# Patient Record
Sex: MALE
[De-identification: names, ages, dates, MRNs, and addresses within clinical notes are randomized per-mention and may not be internally consistent; named-entity substitution may affect disease eponyms.]

---

## 2020-10-21 ENCOUNTER — HOSPITAL ENCOUNTER (OUTPATIENT)
Dept: HOSPITAL 92 - LABBT | Age: 60
Discharge: HOME | End: 2020-10-21
Attending: UROLOGY
Payer: COMMERCIAL

## 2020-10-21 DIAGNOSIS — Z20.828: ICD-10-CM

## 2020-10-21 DIAGNOSIS — Z01.818: Primary | ICD-10-CM

## 2020-10-21 LAB
ANION GAP SERPL CALC-SCNC: 13 MMOL/L (ref 10–20)
APTT PPP: 29.3 SEC (ref 22.9–36.1)
BUN SERPL-MCNC: 13 MG/DL (ref 8.4–25.7)
CALCIUM SERPL-MCNC: 9.2 MG/DL (ref 7.8–10.44)
CHLORIDE SERPL-SCNC: 107 MMOL/L (ref 98–107)
CO2 SERPL-SCNC: 25 MMOL/L (ref 22–29)
CREAT CL PREDICTED SERPL C-G-VRATE: 0 ML/MIN (ref 70–130)
GLUCOSE SERPL-MCNC: 95 MG/DL (ref 70–105)
HGB BLD-MCNC: 15.2 G/DL (ref 14–18)
INR PPP: 0.8
MCH RBC QN AUTO: 34.2 PG (ref 27–31)
MCV RBC AUTO: 100 FL (ref 78–98)
PLATELET # BLD AUTO: 221 THOU/UL (ref 130–400)
POTASSIUM SERPL-SCNC: 4 MMOL/L (ref 3.5–5.1)
PROTHROMBIN TIME: 11.7 SEC (ref 12–14.7)
RBC # BLD AUTO: 4.45 MILL/UL (ref 4.7–6.1)
SODIUM SERPL-SCNC: 141 MMOL/L (ref 136–145)
WBC # BLD AUTO: 6.2 THOU/UL (ref 4.8–10.8)

## 2020-10-21 PROCEDURE — 80048 BASIC METABOLIC PNL TOTAL CA: CPT

## 2020-10-21 PROCEDURE — 85730 THROMBOPLASTIN TIME PARTIAL: CPT

## 2020-10-21 PROCEDURE — U0003 INFECTIOUS AGENT DETECTION BY NUCLEIC ACID (DNA OR RNA); SEVERE ACUTE RESPIRATORY SYNDROME CORONAVIRUS 2 (SARS-COV-2) (CORONAVIRUS DISEASE [COVID-19]), AMPLIFIED PROBE TECHNIQUE, MAKING USE OF HIGH THROUGHPUT TECHNOLOGIES AS DESCRIBED BY CMS-2020-01-R: HCPCS

## 2020-10-21 PROCEDURE — 93005 ELECTROCARDIOGRAM TRACING: CPT

## 2020-10-21 PROCEDURE — 87635 SARS-COV-2 COVID-19 AMP PRB: CPT

## 2020-10-21 PROCEDURE — 93010 ELECTROCARDIOGRAM REPORT: CPT

## 2020-10-21 PROCEDURE — 85027 COMPLETE CBC AUTOMATED: CPT

## 2020-10-21 PROCEDURE — 85610 PROTHROMBIN TIME: CPT

## 2020-10-23 ENCOUNTER — HOSPITAL ENCOUNTER (OUTPATIENT)
Dept: HOSPITAL 92 - SDC | Age: 60
Discharge: HOME | End: 2020-10-23
Attending: UROLOGY
Payer: COMMERCIAL

## 2020-10-23 VITALS — BODY MASS INDEX: 31.3 KG/M2

## 2020-10-23 DIAGNOSIS — R39.11: ICD-10-CM

## 2020-10-23 DIAGNOSIS — N40.1: ICD-10-CM

## 2020-10-23 DIAGNOSIS — E78.5: ICD-10-CM

## 2020-10-23 DIAGNOSIS — N20.0: Primary | ICD-10-CM

## 2020-10-23 DIAGNOSIS — N21.1: ICD-10-CM

## 2020-10-23 DIAGNOSIS — N99.112: ICD-10-CM

## 2020-10-23 DIAGNOSIS — K21.9: ICD-10-CM

## 2020-10-23 DIAGNOSIS — M17.9: ICD-10-CM

## 2020-10-23 PROCEDURE — 0TC38ZZ EXTIRPATION OF MATTER FROM RIGHT KIDNEY PELVIS, VIA NATURAL OR ARTIFICIAL OPENING ENDOSCOPIC: ICD-10-PCS | Performed by: UROLOGY

## 2020-10-23 PROCEDURE — 0TCB8ZZ EXTIRPATION OF MATTER FROM BLADDER, VIA NATURAL OR ARTIFICIAL OPENING ENDOSCOPIC: ICD-10-PCS | Performed by: UROLOGY

## 2020-10-23 PROCEDURE — 74420 UROGRAPHY RTRGR +-KUB: CPT

## 2020-10-23 PROCEDURE — 0T788DZ DILATION OF BILATERAL URETERS WITH INTRALUMINAL DEVICE, VIA NATURAL OR ARTIFICIAL OPENING ENDOSCOPIC: ICD-10-PCS | Performed by: UROLOGY

## 2020-10-23 PROCEDURE — 88300 SURGICAL PATH GROSS: CPT

## 2020-10-23 PROCEDURE — 0TC48ZZ EXTIRPATION OF MATTER FROM LEFT KIDNEY PELVIS, VIA NATURAL OR ARTIFICIAL OPENING ENDOSCOPIC: ICD-10-PCS | Performed by: UROLOGY

## 2020-10-23 PROCEDURE — 82365 CALCULUS SPECTROSCOPY: CPT

## 2020-10-23 NOTE — RAD
EXAM: Retrograde IVP



HISTORY: Kidney stones



COMPARISON: None



FINDINGS/IMPRESSION: Limited intraoperative fluoroscopic views of the retrograde IVP were submitted f
or interpretation. Bilateral ureteral stents are seen in good position. A small calcification

projects over the lower pole the left kidney.  No calcifications are seen along the course of the joseph
nts.



Reported By: Sj hCo 

Electronically Signed:  10/23/2020 10:11 AM

## 2020-10-23 NOTE — OP
DATE OF PROCEDURE:  10/23/2020



SERVICE:  Urology.



PREOPERATIVE DIAGNOSES:  Bilateral renal stones and urethral stone.



POSTOPERATIVE DIAGNOSES:  Bilateral renal stones, bladder stone.



PROCEDURES PERFORMED:  Cystoscopy with cystolitholapaxy, bilateral ureteroscopy,

laser lithotripsy, basket extraction of stones, bilateral retrograde pyelograms and

placement of bilateral 6 x 26 double-J stents. 



INDICATIONS FOR PROCEDURE:  Mr. Lala is a 59-year-old  male who initially

presented to me with dysuria and difficulty urinating.  Cystoscopy in the office

demonstrated a stone within the prostatic urethra just at the membranous urethral

junction.  Recommended to bring him to the operating room for treatment of the

stone.  Of note, the patient also has passing bilateral ureteral stones and I

recommended that we go ahead and plan for treatment of both of the stones.  He also

has additional large stones within each kidney.  I told him we could deal with all

the stones at one time, which he agreed to with all risks and benefits discussed. 



DESCRIPTION OF PROCEDURE:  After identification of armband and verification of

consent, the patient was brought back to the operating room, where he underwent

general anesthesia with an LMA.  He was then placed in a dorsal lithotomy position

and prepped and draped in usual sterile fashion.  After appropriate time-out, a

lubricated 22-Kittitian rigid cystoscope was introduced per urethra up to the level of

the membranous urethra where there was a mild stricture noted.  The stone was gone

at this location either indicating that it had passed or was gone back into the

bladder.  Cystoscopy was then performed into the bladder where the stone was

encountered.  Using a 273 micron ball-tip laser fiber, the stone was fragmented into

small pieces and then irrigated out using the cystoscope.  Initial attention was

then turned to the right ureteral orifice, which was cannulated with a 0.035 Sensor

wire up to the level of renal pelvis.  The cystoscope was then removed and a

dual-lumen catheter was advanced over the Sensor wire to the distal ureter.

Retrograde pyelogram was performed, which did not demonstrate any filling defects

within the distal to mid or proximal ureter.  The Super Stiff wire was then placed

through the second lumen into the renal pelvis and the dual-lumen removed.  A 12/14

long Bard ureteral access sheath was advanced over the Super Stiff wire up to the

level of the proximal ureter.  The inner cannula and Super Stiff wire were then

removed leaving the outer sheath and Sensor wire in place as a safety wire.  A

flexible digital ureteroscope was then passed into the renal pelvis where some

smaller stones were noted and were able to be basketed out using a 1.9-Kittitian Bernardo

basket.  There was one larger stone measuring approximately 6 to 8 mm, which was

fragmented with the laser and then the fragments removed using the basket.

Pull-back ureteroscopy was employed.  No additional stones were noted within the

ureter and no stones left greater than 1 mm within the renal pelvis.  There were no

dilations.  A stent was then used after backloading the cystoscope over the Sensor

wire and positioned fluoroscopically into the kidney.  The wire was removed leaving

a good curl in the kidney and a good curl in the bladder as confirmed both by x-ray

and visual confirmation.  Attention was then turned to the left ureteral orifice,

which was cannulated with a 0.035 Sensor wire to the level of renal pelvis.  The

cystoscope was then removed and a dual-lumen was advanced into the distal ureter and

a retrograde pyelogram was performed.  This did demonstrate a potential filling

defect within the distal ureter, so the dual-lumen was removed and the Sensor wire

was affixed to the drapes as a safety wire.  A semi-rigid ureteroscope was then

placed into the distal ureter, but no stone was encountered up to the level of the

mid ureter indicating that the filling defect was likely a bubble.  The ureteroscope

was then used to guide a Super Stiff wire into the renal pelvis and then

ureteroscope removed.  The same 12/14 ureteral access sheath was advanced over the

Super Stiff wire into the level of the proximal ureter and the inner cannula and

Super Stiff wire were then removed.  A flexible digital ureteroscope was then passed

into the renal pelvis.  The only stone encountered within his kidney was extremely

large approximately 1 cm stone in the mid pole.  Using the same 273 micron ball-tip

laser fiber, the stone was fragmented into small pieces and then a 1.9-Kittitian Bernardo

basket used to remove all the specimens until there were no fragments remaining over

1 mm in size.  Upon completion, there was a significant amount of dust and debris,

but no additional large fragments.  Pull-back ureteroscopy was employed and no

additional stones within the ureter.  No dilation that occurred.  The sheath and

ureteroscope were then removed.  The membranous urethral stricture was slightly

dilated by the cystoscope, but no major trauma occurred.  Therefore, I did not feel

it was necessary to leave a Shelton catheter in this individual.  B and O suppository

were placed in the rectum.  He was taken out of positioning, awakened, taken to PACU

for recovery in stable condition. 



COMPLICATIONS:  None.



ESTIMATED BLOOD LOSS:  Minimal.



RETAINED TUBES AND DRAINS:  Bilateral 6 x 26 double-J stents.



SPECIMENS:  Stone for stone analysis.



DISPOSITION:  The patient will be discharged home and follow up with me in

approximately 1 week for cystoscopy and stent removal. 







Job ID:  [id number]

## 2020-12-29 ENCOUNTER — HOSPITAL ENCOUNTER (OUTPATIENT)
Dept: HOSPITAL 92 - BICULT | Age: 60
Discharge: HOME | End: 2020-12-29
Attending: UROLOGY
Payer: COMMERCIAL

## 2020-12-29 DIAGNOSIS — N20.0: ICD-10-CM

## 2020-12-29 DIAGNOSIS — N20.1: Primary | ICD-10-CM

## 2020-12-29 PROCEDURE — 76770 US EXAM ABDO BACK WALL COMP: CPT

## 2020-12-29 NOTE — ULT
RENAL ULTRASOUND:

12/29/20

 

HISTORY:

Ureteral calculi. 

 

Right kidney measures 11 cm in length. No hydronephrosis. 6 mm calculus mid lower pole collecting str
uctures right kidney. 1.0 cm calculus superior collecting structures right kidney. 

 

1 cm cyst mid right renal cortex. 

 

Left kidney measures 13 cm in length. No hydronephrosis. 

 

1.6 cm cyst medial left kidney with associated calcification. 1.5 cm cyst mid lateral right left kidn
ey. 1 cm renal calcification mid left kidney. 1.5 to 1.7 cm cyst mid left kidney. 

 

Urinary bladder mildly distended and unremarkable. 

 

IMPRESSION: 

1.      No hydronephrosis. 

2.      Bilateral renal cysts.

3.      Bilateral renal calculi. 

 

POS: [facility]

## 2021-04-09 ENCOUNTER — HOSPITAL ENCOUNTER (OUTPATIENT)
Dept: HOSPITAL 92 - BICRAD | Age: 61
Discharge: HOME | End: 2021-04-09
Attending: UROLOGY
Payer: COMMERCIAL

## 2021-04-09 DIAGNOSIS — N20.0: Primary | ICD-10-CM

## 2021-04-09 PROCEDURE — 74018 RADEX ABDOMEN 1 VIEW: CPT

## 2023-03-29 ENCOUNTER — HOSPITAL ENCOUNTER (OUTPATIENT)
Dept: HOSPITAL 92 - BICCT | Age: 63
Discharge: HOME | End: 2023-03-29
Attending: NURSE PRACTITIONER
Payer: COMMERCIAL

## 2023-03-29 DIAGNOSIS — R51.9: ICD-10-CM

## 2023-03-29 DIAGNOSIS — M89.30: ICD-10-CM

## 2023-03-29 DIAGNOSIS — D17.0: ICD-10-CM

## 2023-03-29 DIAGNOSIS — R42: Primary | ICD-10-CM

## 2023-03-29 DIAGNOSIS — R22.0: ICD-10-CM

## 2023-03-29 DIAGNOSIS — H53.9: ICD-10-CM

## 2023-03-29 LAB — EGFRCR SERPLBLD CKD-EPI 2021: 104 ML/MIN/{1.73_M2}

## 2023-03-29 PROCEDURE — 70470 CT HEAD/BRAIN W/O & W/DYE: CPT

## 2023-03-29 PROCEDURE — 82565 ASSAY OF CREATININE: CPT

## 2023-08-16 ENCOUNTER — HOSPITAL ENCOUNTER (OUTPATIENT)
Dept: HOSPITAL 92 - RAD | Age: 63
Discharge: HOME | End: 2023-08-16
Attending: FAMILY MEDICINE
Payer: COMMERCIAL

## 2023-08-16 DIAGNOSIS — I50.9: ICD-10-CM

## 2023-08-16 DIAGNOSIS — R05.9: Primary | ICD-10-CM

## 2023-08-16 PROCEDURE — 71046 X-RAY EXAM CHEST 2 VIEWS: CPT
